# Patient Record
Sex: FEMALE | Race: WHITE | Employment: OTHER | URBAN - NONMETROPOLITAN AREA
[De-identification: names, ages, dates, MRNs, and addresses within clinical notes are randomized per-mention and may not be internally consistent; named-entity substitution may affect disease eponyms.]

---

## 2017-08-01 ENCOUNTER — OFFICE VISIT (OUTPATIENT)
Dept: SURGERY | Age: 82
End: 2017-08-01

## 2017-08-01 VITALS
SYSTOLIC BLOOD PRESSURE: 138 MMHG | BODY MASS INDEX: 24.8 KG/M2 | DIASTOLIC BLOOD PRESSURE: 72 MMHG | WEIGHT: 140 LBS | HEIGHT: 63 IN | RESPIRATION RATE: 15 BRPM | HEART RATE: 82 BPM

## 2017-08-01 DIAGNOSIS — L98.9 SKIN LESION: Primary | ICD-10-CM

## 2017-08-01 PROCEDURE — 4040F PNEUMOC VAC/ADMIN/RCVD: CPT | Performed by: SURGERY

## 2017-08-01 PROCEDURE — 1036F TOBACCO NON-USER: CPT | Performed by: SURGERY

## 2017-08-01 PROCEDURE — G8427 DOCREV CUR MEDS BY ELIG CLIN: HCPCS | Performed by: SURGERY

## 2017-08-01 PROCEDURE — 1123F ACP DISCUSS/DSCN MKR DOCD: CPT | Performed by: SURGERY

## 2017-08-01 PROCEDURE — G8420 CALC BMI NORM PARAMETERS: HCPCS | Performed by: SURGERY

## 2017-08-01 PROCEDURE — 99202 OFFICE O/P NEW SF 15 MIN: CPT | Performed by: SURGERY

## 2017-08-01 PROCEDURE — 1090F PRES/ABSN URINE INCON ASSESS: CPT | Performed by: SURGERY

## 2017-08-03 ENCOUNTER — HOSPITAL ENCOUNTER (OUTPATIENT)
Dept: SURGERY | Age: 82
Discharge: HOME | End: 2017-08-03
Attending: SURGERY | Admitting: SURGERY

## 2017-08-03 PROCEDURE — 11623 EXC S/N/H/F/G MAL+MRG 2.1-3: CPT | Performed by: SURGERY

## 2017-10-01 ENCOUNTER — HOSPITAL ENCOUNTER (OUTPATIENT)
Dept: OTHER | Age: 82
Discharge: HOME | End: 2017-10-31
Attending: FAMILY MEDICINE | Admitting: FAMILY MEDICINE

## 2017-10-20 LAB
ANION GAP SERPL CALCULATED.3IONS-SCNC: 11 MMOL/L (ref 4–16)
BASOPHILS ABSOLUTE: 0 K/CU MM
BASOPHILS RELATIVE PERCENT: 0.4 % (ref 0–1)
BUN BLDV-MCNC: 34 MG/DL (ref 6–23)
CALCIUM SERPL-MCNC: 8.3 MG/DL (ref 8.3–10.6)
CHLORIDE BLD-SCNC: 98 MMOL/L (ref 99–110)
CO2: 36 MMOL/L (ref 21–32)
CREAT SERPL-MCNC: 1.2 MG/DL (ref 0.6–1.1)
DIFFERENTIAL TYPE: ABNORMAL
EOSINOPHILS ABSOLUTE: 0.2 K/CU MM
EOSINOPHILS RELATIVE PERCENT: 2 % (ref 0–3)
GFR AFRICAN AMERICAN: 51 ML/MIN/1.73M2
GFR NON-AFRICAN AMERICAN: 42 ML/MIN/1.73M2
GLUCOSE BLD-MCNC: 95 MG/DL (ref 70–140)
HCT VFR BLD CALC: 32.9 % (ref 37–47)
HEMOGLOBIN: 10.3 GM/DL (ref 12.5–16)
IMMATURE NEUTROPHIL %: 0.3 % (ref 0–0.43)
LYMPHOCYTES ABSOLUTE: 1.3 K/CU MM
LYMPHOCYTES RELATIVE PERCENT: 14.5 % (ref 24–44)
MCH RBC QN AUTO: 31.1 PG (ref 27–31)
MCHC RBC AUTO-ENTMCNC: 31.3 % (ref 32–36)
MCV RBC AUTO: 99.4 FL (ref 78–100)
MONOCYTES ABSOLUTE: 0.6 K/CU MM
MONOCYTES RELATIVE PERCENT: 6.1 % (ref 0–4)
NUCLEATED RBC %: 0 %
PDW BLD-RTO: 13.6 % (ref 11.7–14.9)
PLATELET # BLD: 125 K/CU MM (ref 140–440)
PMV BLD AUTO: 10.3 FL (ref 7.5–11.1)
POTASSIUM SERPL-SCNC: 3.9 MMOL/L (ref 3.5–5.1)
RBC # BLD: 3.31 M/CU MM (ref 4.2–5.4)
SEGMENTED NEUTROPHILS ABSOLUTE COUNT: 7.1 K/CU MM
SEGMENTED NEUTROPHILS RELATIVE PERCENT: 76.7 % (ref 36–66)
SODIUM BLD-SCNC: 145 MMOL/L (ref 135–145)
TOTAL IMMATURE NEUTOROPHIL: 0.03 K/CU MM
TOTAL NUCLEATED RBC: 0 K/CU MM
TSH HIGH SENSITIVITY: 0.97 UIU/ML (ref 0.27–4.2)
WBC # BLD: 9.2 K/CU MM (ref 4–10.5)

## 2017-10-24 LAB
ANION GAP SERPL CALCULATED.3IONS-SCNC: 9 MMOL/L (ref 4–16)
BASOPHILS ABSOLUTE: 0 K/CU MM
BASOPHILS RELATIVE PERCENT: 0.5 % (ref 0–1)
BUN BLDV-MCNC: 26 MG/DL (ref 6–23)
CALCIUM SERPL-MCNC: 8.5 MG/DL (ref 8.3–10.6)
CHLORIDE BLD-SCNC: 102 MMOL/L (ref 99–110)
CO2: 37 MMOL/L (ref 21–32)
CREAT SERPL-MCNC: 1.1 MG/DL (ref 0.6–1.1)
DIFFERENTIAL TYPE: ABNORMAL
EOSINOPHILS ABSOLUTE: 0.4 K/CU MM
EOSINOPHILS RELATIVE PERCENT: 4.4 % (ref 0–3)
GFR AFRICAN AMERICAN: 56 ML/MIN/1.73M2
GFR NON-AFRICAN AMERICAN: 46 ML/MIN/1.73M2
GLUCOSE BLD-MCNC: 76 MG/DL (ref 70–140)
HCT VFR BLD CALC: 33.9 % (ref 37–47)
HEMOGLOBIN: 10.4 GM/DL (ref 12.5–16)
IMMATURE NEUTROPHIL %: 0.8 % (ref 0–0.43)
LYMPHOCYTES ABSOLUTE: 2.1 K/CU MM
LYMPHOCYTES RELATIVE PERCENT: 26.8 % (ref 24–44)
MCH RBC QN AUTO: 30.4 PG (ref 27–31)
MCHC RBC AUTO-ENTMCNC: 30.7 % (ref 32–36)
MCV RBC AUTO: 99.1 FL (ref 78–100)
MONOCYTES ABSOLUTE: 0.6 K/CU MM
MONOCYTES RELATIVE PERCENT: 7.4 % (ref 0–4)
NUCLEATED RBC %: 0 %
PDW BLD-RTO: 13.2 % (ref 11.7–14.9)
PLATELET # BLD: 126 K/CU MM (ref 140–440)
PMV BLD AUTO: 10.7 FL (ref 7.5–11.1)
POTASSIUM SERPL-SCNC: 4 MMOL/L (ref 3.5–5.1)
RBC # BLD: 3.42 M/CU MM (ref 4.2–5.4)
SEGMENTED NEUTROPHILS ABSOLUTE COUNT: 4.7 K/CU MM
SEGMENTED NEUTROPHILS RELATIVE PERCENT: 60.1 % (ref 36–66)
SODIUM BLD-SCNC: 148 MMOL/L (ref 135–145)
TOTAL IMMATURE NEUTOROPHIL: 0.06 K/CU MM
TOTAL NUCLEATED RBC: 0 K/CU MM
TSH HIGH SENSITIVITY: 1.86 UIU/ML (ref 0.27–4.2)
WBC # BLD: 7.9 K/CU MM (ref 4–10.5)

## 2017-11-01 ENCOUNTER — HOSPITAL ENCOUNTER (OUTPATIENT)
Dept: OTHER | Age: 82
Discharge: HOME | End: 2017-11-30
Attending: FAMILY MEDICINE | Admitting: FAMILY MEDICINE

## 2018-05-01 ENCOUNTER — HOSPITAL ENCOUNTER (OUTPATIENT)
Dept: OTHER | Age: 83
Discharge: HOME | End: 2018-05-31
Attending: FAMILY MEDICINE | Admitting: FAMILY MEDICINE

## 2018-05-10 ENCOUNTER — OUTSIDE SERVICES (OUTPATIENT)
Dept: WOUND CARE | Age: 83
End: 2018-05-10

## 2018-05-10 DIAGNOSIS — S81.811A NONINFECTED SKIN TEAR OF RIGHT LOWER EXTREMITY, INITIAL ENCOUNTER: Primary | ICD-10-CM

## 2018-05-10 PROCEDURE — 99304 1ST NF CARE SF/LOW MDM 25: CPT | Performed by: NURSE PRACTITIONER

## 2018-05-17 ENCOUNTER — OUTSIDE SERVICES (OUTPATIENT)
Dept: WOUND CARE | Age: 83
End: 2018-05-17

## 2018-05-17 DIAGNOSIS — S81.811D NONINFECTED SKIN TEAR OF RIGHT LOWER EXTREMITY, SUBSEQUENT ENCOUNTER: Primary | ICD-10-CM

## 2018-05-17 PROCEDURE — 99308 SBSQ NF CARE LOW MDM 20: CPT | Performed by: NURSE PRACTITIONER

## 2018-05-24 ENCOUNTER — OUTSIDE SERVICES (OUTPATIENT)
Dept: WOUND CARE | Age: 83
End: 2018-05-24

## 2018-05-24 DIAGNOSIS — S81.811D NONINFECTED SKIN TEAR OF RIGHT LOWER EXTREMITY, SUBSEQUENT ENCOUNTER: Primary | ICD-10-CM

## 2018-05-24 PROCEDURE — 99307 SBSQ NF CARE SF MDM 10: CPT | Performed by: NURSE PRACTITIONER

## 2018-05-31 LAB
ALBUMIN SERPL-MCNC: 3.2 GM/DL (ref 3.4–5)
ALP BLD-CCNC: 71 IU/L (ref 40–129)
ALT SERPL-CCNC: 30 U/L (ref 10–40)
ANION GAP SERPL CALCULATED.3IONS-SCNC: 12 MMOL/L (ref 4–16)
AST SERPL-CCNC: 13 IU/L (ref 15–37)
BASOPHILS ABSOLUTE: 0.1 K/CU MM
BASOPHILS RELATIVE PERCENT: 0.6 % (ref 0–1)
BILIRUB SERPL-MCNC: 0.4 MG/DL (ref 0–1)
BUN BLDV-MCNC: 16 MG/DL (ref 6–23)
CALCIUM SERPL-MCNC: 8.2 MG/DL (ref 8.3–10.6)
CHLORIDE BLD-SCNC: 100 MMOL/L (ref 99–110)
CO2: 34 MMOL/L (ref 21–32)
CREAT SERPL-MCNC: 1.1 MG/DL (ref 0.6–1.1)
DIFFERENTIAL TYPE: ABNORMAL
EOSINOPHILS ABSOLUTE: 0.5 K/CU MM
EOSINOPHILS RELATIVE PERCENT: 5.2 % (ref 0–3)
ESTIMATED AVERAGE GLUCOSE: 120 MG/DL
GFR AFRICAN AMERICAN: 56 ML/MIN/1.73M2
GFR NON-AFRICAN AMERICAN: 46 ML/MIN/1.73M2
GLUCOSE BLD-MCNC: 72 MG/DL (ref 70–99)
HBA1C MFR BLD: 5.8 % (ref 4.2–6.3)
HCT VFR BLD CALC: 34.1 % (ref 37–47)
HEMOGLOBIN: 10 GM/DL (ref 12.5–16)
IMMATURE NEUTROPHIL %: 0.7 % (ref 0–0.43)
LYMPHOCYTES ABSOLUTE: 2.3 K/CU MM
LYMPHOCYTES RELATIVE PERCENT: 26 % (ref 24–44)
MCH RBC QN AUTO: 29.9 PG (ref 27–31)
MCHC RBC AUTO-ENTMCNC: 29.3 % (ref 32–36)
MCV RBC AUTO: 101.8 FL (ref 78–100)
MONOCYTES ABSOLUTE: 0.7 K/CU MM
MONOCYTES RELATIVE PERCENT: 7.6 % (ref 0–4)
NUCLEATED RBC %: 0 %
PDW BLD-RTO: 13.2 % (ref 11.7–14.9)
PLATELET # BLD: 131 K/CU MM (ref 140–440)
PMV BLD AUTO: 10.3 FL (ref 7.5–11.1)
POTASSIUM SERPL-SCNC: 3.7 MMOL/L (ref 3.5–5.1)
RBC # BLD: 3.35 M/CU MM (ref 4.2–5.4)
SEGMENTED NEUTROPHILS ABSOLUTE COUNT: 5.2 K/CU MM
SEGMENTED NEUTROPHILS RELATIVE PERCENT: 59.9 % (ref 36–66)
SODIUM BLD-SCNC: 146 MMOL/L (ref 135–145)
TOTAL IMMATURE NEUTOROPHIL: 0.06 K/CU MM
TOTAL NUCLEATED RBC: 0 K/CU MM
TOTAL PROTEIN: 5.6 GM/DL (ref 6.4–8.2)
WBC # BLD: 8.7 K/CU MM (ref 4–10.5)

## 2018-06-01 ENCOUNTER — HOSPITAL ENCOUNTER (OUTPATIENT)
Dept: OTHER | Age: 83
Discharge: HOME | End: 2018-06-30
Attending: FAMILY MEDICINE | Admitting: FAMILY MEDICINE

## 2018-06-02 LAB
BACTERIA: NEGATIVE /HPF
BILIRUBIN URINE: NEGATIVE MG/DL
BLOOD, URINE: NEGATIVE
CALCIUM OXALATE CRYSTALS: ABNORMAL /HPF
CLARITY: ABNORMAL
COLOR: YELLOW
GLUCOSE, URINE: NEGATIVE MG/DL
HYALINE CASTS: 2 /LPF
KETONES, URINE: NEGATIVE MG/DL
LEUKOCYTE ESTERASE, URINE: NEGATIVE
MUCUS: ABNORMAL HPF
NITRITE URINE, QUANTITATIVE: NEGATIVE
PH, URINE: 5 (ref 5–8)
PROTEIN UA: NEGATIVE MG/DL
RBC URINE: 1 /HPF (ref 0–6)
SPECIFIC GRAVITY UA: 1.01 (ref 1–1.03)
SQUAMOUS EPITHELIAL: 4 /HPF
TRICHOMONAS: ABNORMAL /HPF
UROBILINOGEN, URINE: NORMAL MG/DL (ref 0.2–1)
WBC UA: <1 /HPF (ref 0–5)

## 2018-06-04 LAB
CULTURE: NORMAL
ORGANISM: NORMAL
REPORT STATUS: NORMAL
REQUEST PROBLEM: NORMAL
SPECIMEN: NORMAL
TOTAL COLONY COUNT: NORMAL

## 2018-07-01 ENCOUNTER — HOSPITAL ENCOUNTER (OUTPATIENT)
Dept: OTHER | Age: 83
Discharge: HOME | End: 2018-07-31
Attending: FAMILY MEDICINE | Admitting: FAMILY MEDICINE

## 2018-08-02 ENCOUNTER — OUTSIDE SERVICES (OUTPATIENT)
Dept: WOUND CARE | Age: 83
End: 2018-08-02

## 2018-08-02 DIAGNOSIS — E11.621 DIABETIC ULCER OF RIGHT HEEL ASSOCIATED WITH TYPE 2 DIABETES MELLITUS, UNSPECIFIED ULCER STAGE (HCC): Primary | ICD-10-CM

## 2018-08-02 DIAGNOSIS — L97.419 DIABETIC ULCER OF RIGHT HEEL ASSOCIATED WITH TYPE 2 DIABETES MELLITUS, UNSPECIFIED ULCER STAGE (HCC): Primary | ICD-10-CM

## 2018-08-02 PROCEDURE — 99308 SBSQ NF CARE LOW MDM 20: CPT | Performed by: NURSE PRACTITIONER

## 2018-08-09 ENCOUNTER — OUTSIDE SERVICES (OUTPATIENT)
Dept: WOUND CARE | Age: 83
End: 2018-08-09

## 2018-08-09 DIAGNOSIS — L97.419 DIABETIC ULCER OF RIGHT HEEL ASSOCIATED WITH TYPE 2 DIABETES MELLITUS, UNSPECIFIED ULCER STAGE (HCC): Primary | ICD-10-CM

## 2018-08-09 DIAGNOSIS — E11.621 DIABETIC ULCER OF RIGHT HEEL ASSOCIATED WITH TYPE 2 DIABETES MELLITUS, UNSPECIFIED ULCER STAGE (HCC): Primary | ICD-10-CM

## 2018-08-09 PROCEDURE — 99308 SBSQ NF CARE LOW MDM 20: CPT | Performed by: NURSE PRACTITIONER

## 2018-08-09 NOTE — PROGRESS NOTES
Wound Care Progress Note       Jaydon RAMOS Ridevan  AGE: 80 y.o. GENDER: female  : 1925  TODAY'S DATE:  2018        Subjective:     Chief Complaint   Patient presents with    Wound Check     right heel         HISTORY of PRESENT ILLNESS     Isaura Cheney is a 80 y.o. female who presents today for wound evaluation of Chronic diabetic wound(s) of right medial heel. The wound is of moderate severity. The underlying cause of the wound is diabetes and edema. Area was moist and dark purple last week, this week is more stable eschar. Wound Pain Timing/Severity: intermittent  Quality of pain: tender  Severity of pain:  2 / 10   Modifying Factors: edema, diabetes and decreased mobility  Associated Signs/Symptoms: none        PAST MEDICAL HISTORY        Diagnosis Date    A-fib [facility])     Carotid artery stenosis     [facility]     Chronic back pain     Compression fx, lumbar spine ([address]     scheduled for vertebroplasty 2014\"not sure if lumbar or thoracic\"    H/O 24 hour EKG monitoring 00    Frequent PACs, non sustained SVT with a rate of 130 bpm    H/O cardiovascular stress test 9/15/09    Normal pattern of perfusion in all coronaries EF 68%    H/O Doppler ultrasound      Less than 50% stenosis of both internal carotid arteries. 3/11 Carotid-No significant stenosis    H/O echocardiogram        Mild elevated pulm. systolic pressure. Mild MT/TR and aortic valve regur.   Mild MR, AR, mild pulmonary hypertension EF 50-55%    H/O hypotension     Hyperlipidemia     Hypertension     Lumbar spinal stenosis     Osteoarthritis     bilateral shoulder pain    [facility]) ([facility])     Type II or unspecified type diabetes mellitus without mention of complication, not stated as uncontrolled     UTI (lower urinary tract infection)        PAST SURGICAL HISTORY    Past Surgical History:   Procedure Laterality Date    CATARACT REMOVAL      mouth daily. No current facility-administered medications on file prior to visit. REVIEW OF SYSTEMS    Pertinent items are noted in HPI. Constitutional: Negative for systemic symptoms including fever, chills and malaise. Objective: There were no vitals taken for this visit. PHYSICAL EXAM    General: The patient is in no acute distress. Mental status:  Patient is appropriate, is  oriented to place and plan of care. Dermatologic exam: Visual inspection of the periwound reveals the skin to be edematous. Wound exam:  see wound description below     All active wounds listed below with today's date are evaluated    Right medial heel: Length: 3 cm, width 3 cm, depth JULIETTE, 100% stable eschar    Assessment:     Problem List Items Addressed This Visit     Diabetic ulcer of right heel associated with type 2 diabetes mellitus ([facility] [address] - Primary          Status of wound progress and description from last visit:   unchanged. Plan:      Do not scrub or use excessive force. Wash hands with soap and water before and after dressing changes. Prior to dressing application, cleanse wound with normal saline, wound cleanser, or mild soap and water. To right medial heel, paint with betadine, cover with telfa, and wrap with conform. Change daily and as needed. Avoid pressure to the heel area, including shoes that have backs. Float heels off of mattress and chairs. Encourage patient to elevate legs throughout the day. Patient may wear tubi  stockings - place on in the morning and take off at night.        Electronically signed by RENALDO Corona CNP on 8/9/2018 at 9:45 AM

## 2018-08-16 ENCOUNTER — OUTSIDE SERVICES (OUTPATIENT)
Dept: WOUND CARE | Age: 83
End: 2018-08-16

## 2018-08-16 DIAGNOSIS — L97.419 DIABETIC ULCER OF RIGHT HEEL ASSOCIATED WITH TYPE 2 DIABETES MELLITUS, UNSPECIFIED ULCER STAGE (HCC): Primary | ICD-10-CM

## 2018-08-16 DIAGNOSIS — E11.621 DIABETIC ULCER OF RIGHT HEEL ASSOCIATED WITH TYPE 2 DIABETES MELLITUS, UNSPECIFIED ULCER STAGE (HCC): Primary | ICD-10-CM

## 2018-08-16 PROCEDURE — 11042 DBRDMT SUBQ TIS 1ST 20SQCM/<: CPT | Performed by: NURSE PRACTITIONER

## 2018-08-16 NOTE — PROGRESS NOTES
Wound Care Progress Note with Procedure Note    Jaydon Gaines  AGE: 80 y.o. GENDER: female  : 1925  EPISODE DATE:  2018     Subjective:     Chief Complaint   Patient presents with    Wound Check     right heel         HISTORY of PRESENT ILLNESS      Jo Dobson is a 80 y.o. female who presents today for wound evaluation of Chronic diabetic wound(s) of the right medial heel. The wound is of moderate severity. The underlying cause of the wound is diabetes. Today, an area of necrotic tissue was debrided from the wound with healthy pink granulation tissue underneath. Wound Pain Timing/Severity: intermittent  Quality of pain: tender  Severity of pain:  3 / 10   Modifying Factors: edema, diabetes and decreased mobility  Associated Signs/Symptoms: none        PAST MEDICAL HISTORY        Diagnosis Date    A-fib [facility])     Carotid artery stenosis     [facility]congest[facility]) [facility])     Chronic back pain     Compression fx, lumbar spine [address] [address]     scheduled for vertebroplasty 2014\"not sure if lumbar or thoracic\"    H/O 24 hour EKG monitoring 00    Frequent PACs, non sustained SVT with a rate of 130 bpm    H/O cardiovascular stress test 9/15/09    Normal pattern of perfusion in all coronaries EF 68%    H/O Doppler ultrasound      Less than 50% stenosis of both internal carotid arteries. 3/11 Carotid-No significant stenosis    H/O echocardiogram        Mild elevated pulm. systolic pressure. Mild MT/TR and aortic valve regur.   Mild MR, AR, mild pulmonary hypertension EF 50-55%    H/O hypotension     Hyperlipidemia     Hypertension     Lumbar spinal stenosis     Osteoarthritis     bilateral shoulder pain    PVD (peripheral vascular disease) ([facility])     Type II or unspecified type diabetes mellitus without mention of complication, not stated as uncontrolled     UTI (lower urinary tract infection)        PAST SURGICAL HISTORY    Past Surgical History:   Procedure atorvastatin (LIPITOR) 20 MG tablet Take 20 mg by mouth daily. No current facility-administered medications on file prior to visit. REVIEW OF SYSTEMS    Pertinent items are noted in HPI. Constitutional: Negative for systemic symptoms including fever, chills and malaise. Objective: There were no vitals taken for this visit. PHYSICAL EXAM    General: The patient is in no acute distress. Mental status:  Patient is appropriate, is  oriented to place and plan of care. Dermatologic exam: Visual inspection of the periwound reveals the skin to be edematous  Wound exam: see wound description below in procedure note    Right medial heel: Length: 3 cm, width 2.8 cm, depth JULIETTE, 90% stable eschar, 10% red      Assessment:     Problem List Items Addressed This Visit     Diabetic ulcer of right heel associated with type 2 diabetes mellitus ([facility] [address] - Primary        Procedure Note    Indications:  Based on my examination of this patient's wound(s) today, sharp excision into necrotic subcutaneous tissue is required to promote healing and evaluate the extent of previous healing. Performed by: RENALDO Laureano - CNP    Consent obtained: Yes    Time out taken:  Yes    Pain Control: Not needed       Debridement:Excisional Debridement    Using #15 blade scalpel and forceps the wound(s) was/were sharply debrided down through and including the removal of subcutaneous tissue.         Devitalized Tissue Debrided:  necrotic/eschar    Pre Debridement Measurements:  Are located in the Wound Documentation Flow Sheet    All active wounds listed below with today's date are evaluated  Wound(s)    debrided this date include # : right medial heel    Post  Debridement Measurements:  Right medial heel: Length: 3 cm, width 2.8 cm, depth JULIETTE    Percent of Wound(s) Debrided: approximately 20%    Total  Area  Debrided:  2 sq cm     Bleeding:  None    Hemostasis Achieved:  not needed    Procedural Pain:  3  / 10

## 2018-08-23 ENCOUNTER — OUTSIDE SERVICES (OUTPATIENT)
Dept: WOUND CARE | Age: 83
End: 2018-08-23

## 2018-08-23 DIAGNOSIS — E11.621 DIABETIC ULCER OF RIGHT HEEL ASSOCIATED WITH TYPE 2 DIABETES MELLITUS, UNSPECIFIED ULCER STAGE (HCC): Primary | ICD-10-CM

## 2018-08-23 DIAGNOSIS — L97.419 DIABETIC ULCER OF RIGHT HEEL ASSOCIATED WITH TYPE 2 DIABETES MELLITUS, UNSPECIFIED ULCER STAGE (HCC): Primary | ICD-10-CM

## 2018-08-23 PROCEDURE — 99308 SBSQ NF CARE LOW MDM 20: CPT | Performed by: NURSE PRACTITIONER

## 2018-08-23 NOTE — PROGRESS NOTES
Wound Care Progress Note       Jaydon RAMOS Ridevan  AGE: 80 y.o. GENDER: female  : 1925  TODAY'S DATE:  2018        Subjective:     Chief Complaint   Patient presents with    Wound Check     right heel         HISTORY of PRESENT ILLNESS     Cesar Duvall is a 80 y.o. female who presents today for wound evaluation of Chronic diabetic wound(s) of right medial heel. The wound is of moderate severity. The underlying cause of the wound is diabetes. Wound Pain Timing/Severity: intermittent  Quality of pain: aching  Severity of pain:  2 / 10   Modifying Factors: edema, diabetes and decreased mobility  Associated Signs/Symptoms: none        PAST MEDICAL HISTORY        Diagnosis Date    A-fib [facility])     Carotid artery stenosis     [facility]congest[facility]) ([facility])     Chronic back pain     Compression fx, lumbar spine [address] [address]     scheduled for vertebroplasty 2014\"not sure if lumbar or thoracic\"    H/O 24 hour EKG monitoring 00    Frequent PACs, non sustained SVT with a rate of 130 bpm    H/O cardiovascular stress test 9/15/09    Normal pattern of perfusion in all coronaries EF 68%    H/O Doppler ultrasound      Less than 50% stenosis of both internal carotid arteries. 3/11 Carotid-No significant stenosis    H/O echocardiogram        Mild elevated pulm. systolic pressure. Mild MT/TR and aortic valve regur.   Mild MR, AR, mild pulmonary hypertension EF 50-55%    H/O hypotension     Hyperlipidemia     Hypertension     Lumbar spinal stenosis     Osteoarthritis     bilateral shoulder pain    PVD [facility]peripheral vascular disease) ([facility])     Type II or unspecified type diabetes mellitus without mention of complication, not stated as uncontrolled     UTI (lower urinary tract infection)        PAST SURGICAL HISTORY    Past Surgical History:   Procedure Laterality Date    CATARACT REMOVAL      CHOLECYSTECTOMY      CYSTOCELE REPAIR      GALLBLADDER SURGERY      HYSTERECTOMY      JOINT REPLACEMENT      RECTOCELE REPAIR      GARY AND BSO      TOTAL HIP ARTHROPLASTY      bilateral       FAMILY HISTORY    Family History   Problem Relation Age of Onset    Diabetes Mother     Stroke Mother     Heart Disease Sister     Diabetes Sister     Heart Disease Brother     Diabetes Brother     Heart Disease Sister     Diabetes Sister     Heart Disease Brother     Diabetes Brother        SOCIAL HISTORY    Social History   Substance Use Topics    Smoking status: Never Smoker    Smokeless tobacco: Never Used      Comment: caregiver unsure of pts social of surgical hx with phone assess 2/25/2014    Alcohol use No       ALLERGIES    No Known Allergies    MEDICATIONS    Current Outpatient Prescriptions on File Prior to Visit   Medication Sig Dispense Refill    simvastatin (ZOCOR) 20 MG tablet Take 20 mg by mouth nightly.  HYDROcodone-acetaminophen (NORCO) 5-325 MG per tablet Take 1 tablet by mouth every 6 hours as needed for Pain.  traMADol (ULTRAM) 50 MG tablet Take 50 mg by mouth every 8 hours as needed for Pain.  aspirin 81 MG EC tablet Take 1 tablet by mouth daily. 30 tablet 1    insulin glargine (LANTUS) 100 UNIT/ML injection Inject 12 Units into the skin nightly. 1 Pen 3    docusate sodium (COLACE) 100 MG capsule Take 1 capsule by mouth 2 times daily as needed for Constipation. 30 capsule 0    furosemide (LASIX) 40 MG tablet Take 0.5 tablets by mouth as needed (LL swelling). 30 tablet 1    acetaminophen 650 MG TABS Take 650 mg by mouth every 4 hours as needed for Pain. 30 tablet 1    carvedilol (COREG) 6.25 MG tablet Take 6.25 mg by mouth 2 times daily (with meals).  citalopram (CELEXA) 10 MG tablet Take 10 mg by mouth daily.  timolol (BETIMOL) 0.25 % ophthalmic solution Place 1-2 drops into both eyes 2 times daily.  atorvastatin (LIPITOR) 20 MG tablet Take 20 mg by mouth daily. No current facility-administered medications on file prior to visit.

## 2018-08-30 ENCOUNTER — OUTSIDE SERVICES (OUTPATIENT)
Dept: WOUND CARE | Age: 83
End: 2018-08-30

## 2018-08-30 DIAGNOSIS — L97.419 DIABETIC ULCER OF RIGHT HEEL ASSOCIATED WITH TYPE 2 DIABETES MELLITUS, UNSPECIFIED ULCER STAGE (HCC): Primary | ICD-10-CM

## 2018-08-30 DIAGNOSIS — E11.621 DIABETIC ULCER OF RIGHT HEEL ASSOCIATED WITH TYPE 2 DIABETES MELLITUS, UNSPECIFIED ULCER STAGE (HCC): Primary | ICD-10-CM

## 2018-08-30 PROCEDURE — 99308 SBSQ NF CARE LOW MDM 20: CPT | Performed by: NURSE PRACTITIONER

## 2018-08-30 NOTE — PROGRESS NOTES
stated as uncontrolled     UTI (lower urinary tract infection)        PAST SURGICAL HISTORY    Past Surgical History:   Procedure Laterality Date    CATARACT REMOVAL      CHOLECYSTECTOMY      CYSTOCELE REPAIR      GALLBLADDER SURGERY      HYSTERECTOMY      JOINT REPLACEMENT      RECTOCELE REPAIR      GARY AND BSO      TOTAL HIP ARTHROPLASTY      bilateral       FAMILY HISTORY    Family History   Problem Relation Age of Onset    Diabetes Mother     Stroke Mother     Heart Disease Sister     Diabetes Sister     Heart Disease Brother     Diabetes Brother     Heart Disease Sister     Diabetes Sister     Heart Disease Brother     Diabetes Brother        SOCIAL HISTORY    Social History   Substance Use Topics    Smoking status: Never Smoker    Smokeless tobacco: Never Used      Comment: caregiver unsure of pts social of surgical hx with phone assess 2/25/2014    Alcohol use No       ALLERGIES    No Known Allergies    MEDICATIONS    Current Outpatient Prescriptions on File Prior to Visit   Medication Sig Dispense Refill    simvastatin (ZOCOR) 20 MG tablet Take 20 mg by mouth nightly.  HYDROcodone-acetaminophen (NORCO) 5-325 MG per tablet Take 1 tablet by mouth every 6 hours as needed for Pain.  traMADol (ULTRAM) 50 MG tablet Take 50 mg by mouth every 8 hours as needed for Pain.  aspirin 81 MG EC tablet Take 1 tablet by mouth daily. 30 tablet 1    insulin glargine (LANTUS) 100 UNIT/ML injection Inject 12 Units into the skin nightly. 1 Pen 3    docusate sodium (COLACE) 100 MG capsule Take 1 capsule by mouth 2 times daily as needed for Constipation. 30 capsule 0    furosemide (LASIX) 40 MG tablet Take 0.5 tablets by mouth as needed (LL swelling). 30 tablet 1    acetaminophen 650 MG TABS Take 650 mg by mouth every 4 hours as needed for Pain. 30 tablet 1    carvedilol (COREG) 6.25 MG tablet Take 6.25 mg by mouth 2 times daily (with meals).       citalopram (CELEXA) 10 MG tablet Take 10

## 2018-09-06 ENCOUNTER — OUTSIDE SERVICES (OUTPATIENT)
Dept: WOUND CARE | Age: 83
End: 2018-09-06

## 2018-09-06 DIAGNOSIS — E11.621 DIABETIC ULCER OF RIGHT HEEL ASSOCIATED WITH TYPE 2 DIABETES MELLITUS, UNSPECIFIED ULCER STAGE (HCC): Primary | ICD-10-CM

## 2018-09-06 DIAGNOSIS — L97.419 DIABETIC ULCER OF RIGHT HEEL ASSOCIATED WITH TYPE 2 DIABETES MELLITUS, UNSPECIFIED ULCER STAGE (HCC): Primary | ICD-10-CM

## 2018-09-06 PROCEDURE — 99308 SBSQ NF CARE LOW MDM 20: CPT | Performed by: NURSE PRACTITIONER

## 2018-09-20 ENCOUNTER — OUTSIDE SERVICES (OUTPATIENT)
Dept: WOUND CARE | Age: 83
End: 2018-09-20

## 2018-09-20 DIAGNOSIS — E11.621 DIABETIC ULCER OF RIGHT HEEL ASSOCIATED WITH TYPE 2 DIABETES MELLITUS, UNSPECIFIED ULCER STAGE (HCC): Primary | ICD-10-CM

## 2018-09-20 DIAGNOSIS — I73.9 PERIPHERAL VASCULAR DISEASE (HCC): ICD-10-CM

## 2018-09-20 DIAGNOSIS — L97.419 DIABETIC ULCER OF RIGHT HEEL ASSOCIATED WITH TYPE 2 DIABETES MELLITUS, UNSPECIFIED ULCER STAGE (HCC): Primary | ICD-10-CM

## 2018-09-20 PROCEDURE — 99308 SBSQ NF CARE LOW MDM 20: CPT | Performed by: NURSE PRACTITIONER

## 2018-09-20 NOTE — PROGRESS NOTES
MG tablet Take 20 mg by mouth daily. No current facility-administered medications on file prior to visit. REVIEW OF SYSTEMS    Pertinent items are noted in HPI. Constitutional: Negative for systemic symptoms including fever, chills and malaise. Objective: There were no vitals taken for this visit. PHYSICAL EXAM    General: The patient is in no acute distress. Mental status:  Patient is appropriate, is  oriented to place and plan of care. Dermatologic exam: Visual inspection of the periwound reveals the skin to be edematous. Wound exam:  see wound description below     All active wounds listed below with today's date are evaluated    Right medial heel: Length: 3.5 cm, width 2.5 cm, depth JULIETTE, 100% stable eschar    Assessment:       Problem List Items Addressed This Visit     Diabetic ulcer of right heel associated with type 2 diabetes mellitus ([facility] [address] - Primary      Other Visit Diagnoses     [facility]              Status of wound progress and description from last visit:  Slightly improved. Plan:      Do not scrub or use excessive force. Wash hands with soap and water before and after dressing changes. Prior to dressing application, cleanse wound with normal saline, wound cleanser, or mild soap and water. To right medial heel, paint with betadine, cover with telfa, and wrap with conform. Change daily and as needed. Avoid pressure to the heel area, including shoes that have backs. Float heels off of mattress and chairs. Encourage patient to elevate legs throughout the day. Patient may wear tubi  stockings - place on in the morning and take off at night.        Electronically signed by RENALDO Tate CNP on 9/20/2018 at 10:36 AM

## 2018-09-27 ENCOUNTER — OUTSIDE SERVICES (OUTPATIENT)
Dept: WOUND CARE | Age: 83
End: 2018-09-27
Payer: MEDICARE

## 2018-09-27 DIAGNOSIS — E11.621 DIABETIC ULCER OF RIGHT HEEL ASSOCIATED WITH TYPE 2 DIABETES MELLITUS, UNSPECIFIED ULCER STAGE (HCC): Primary | ICD-10-CM

## 2018-09-27 DIAGNOSIS — L97.419 DIABETIC ULCER OF RIGHT HEEL ASSOCIATED WITH TYPE 2 DIABETES MELLITUS, UNSPECIFIED ULCER STAGE (HCC): Primary | ICD-10-CM

## 2018-09-27 PROCEDURE — 99308 SBSQ NF CARE LOW MDM 20: CPT | Performed by: NURSE PRACTITIONER

## 2018-10-04 ENCOUNTER — OUTSIDE SERVICES (OUTPATIENT)
Dept: WOUND CARE | Age: 83
End: 2018-10-04
Payer: MEDICARE

## 2018-10-04 DIAGNOSIS — E11.621 DIABETIC ULCER OF RIGHT HEEL ASSOCIATED WITH TYPE 2 DIABETES MELLITUS, UNSPECIFIED ULCER STAGE (HCC): Primary | ICD-10-CM

## 2018-10-04 DIAGNOSIS — I73.9 PAD (PERIPHERAL ARTERY DISEASE) (HCC): ICD-10-CM

## 2018-10-04 DIAGNOSIS — L97.419 DIABETIC ULCER OF RIGHT HEEL ASSOCIATED WITH TYPE 2 DIABETES MELLITUS, UNSPECIFIED ULCER STAGE (HCC): Primary | ICD-10-CM

## 2018-10-04 PROCEDURE — 99308 SBSQ NF CARE LOW MDM 20: CPT | Performed by: NURSE PRACTITIONER

## 2018-10-04 NOTE — PROGRESS NOTES
MG tablet Take 20 mg by mouth daily. No current facility-administered medications on file prior to visit. REVIEW OF SYSTEMS    Pertinent items are noted in HPI. Constitutional: Negative for systemic symptoms including fever, chills and malaise. Objective: There were no vitals taken for this visit. PHYSICAL EXAM    General: The patient is in no acute distress. Mental status:  Patient is appropriate, is  oriented to place and plan of care. Dermatologic exam: Visual inspection of the periwound reveals the skin to be edematous. Wound exam:  see wound description below     All active wounds listed below with today's date are evaluated    Right medial heel: Length: 2.5 cm, width 3.2 cm, depth JULIETTE, 100% stable eschar      Assessment:     Problem List Items Addressed This Visit     Diabetic ulcer of right heel associated with type 2 diabetes mellitus ([facility] [address] - Primary      Other Visit Diagnoses     [facility]              Status of wound progress and description from last visit:   unchanged. Plan:      Do not scrub or use excessive force. Wash hands with soap and water before and after dressing changes. Prior to dressing application, cleanse wound with normal saline, wound cleanser, or mild soap and water. To right medial heel, paint with betadine, cover with telfa, and wrap with conform. Change daily and as needed. Avoid pressure to the heel area, including shoes that have backs. Float heels off of mattress and chairs. Encourage patient to elevate legs throughout the day. Patient may wear tubi  stockings - place on in the morning and take off at night.        Electronically signed by RENALDO Herman CNP on 10/4/2018 at 10:59 AM

## 2018-10-11 ENCOUNTER — OUTSIDE SERVICES (OUTPATIENT)
Dept: WOUND CARE | Age: 83
End: 2018-10-11
Payer: MEDICARE

## 2018-10-11 DIAGNOSIS — L97.419 DIABETIC ULCER OF RIGHT HEEL ASSOCIATED WITH TYPE 2 DIABETES MELLITUS, UNSPECIFIED ULCER STAGE (HCC): Primary | ICD-10-CM

## 2018-10-11 DIAGNOSIS — E11.621 DIABETIC ULCER OF RIGHT HEEL ASSOCIATED WITH TYPE 2 DIABETES MELLITUS, UNSPECIFIED ULCER STAGE (HCC): Primary | ICD-10-CM

## 2018-10-11 PROCEDURE — 99308 SBSQ NF CARE LOW MDM 20: CPT | Performed by: NURSE PRACTITIONER

## 2018-10-25 ENCOUNTER — OUTSIDE SERVICES (OUTPATIENT)
Dept: WOUND CARE | Age: 83
End: 2018-10-25
Payer: MEDICARE

## 2018-10-25 DIAGNOSIS — L97.419 DIABETIC ULCER OF RIGHT HEEL ASSOCIATED WITH TYPE 2 DIABETES MELLITUS, UNSPECIFIED ULCER STAGE (HCC): Primary | ICD-10-CM

## 2018-10-25 DIAGNOSIS — E11.621 DIABETIC ULCER OF RIGHT HEEL ASSOCIATED WITH TYPE 2 DIABETES MELLITUS, UNSPECIFIED ULCER STAGE (HCC): Primary | ICD-10-CM

## 2018-10-25 DIAGNOSIS — I73.9 PAD (PERIPHERAL ARTERY DISEASE) (HCC): ICD-10-CM

## 2018-10-25 PROCEDURE — 99308 SBSQ NF CARE LOW MDM 20: CPT | Performed by: NURSE PRACTITIONER

## 2018-11-01 ENCOUNTER — OUTSIDE SERVICES (OUTPATIENT)
Dept: WOUND CARE | Age: 83
End: 2018-11-01
Payer: MEDICARE

## 2018-11-01 DIAGNOSIS — I73.9 PAD (PERIPHERAL ARTERY DISEASE) (HCC): ICD-10-CM

## 2018-11-01 DIAGNOSIS — L97.419 DIABETIC ULCER OF RIGHT HEEL ASSOCIATED WITH TYPE 2 DIABETES MELLITUS, UNSPECIFIED ULCER STAGE (HCC): Primary | ICD-10-CM

## 2018-11-01 DIAGNOSIS — E11.621 DIABETIC ULCER OF RIGHT HEEL ASSOCIATED WITH TYPE 2 DIABETES MELLITUS, UNSPECIFIED ULCER STAGE (HCC): Primary | ICD-10-CM

## 2018-11-01 PROCEDURE — 99308 SBSQ NF CARE LOW MDM 20: CPT | Performed by: NURSE PRACTITIONER

## 2018-11-01 NOTE — PROGRESS NOTES
Take 20 mg by mouth daily. No current facility-administered medications on file prior to visit. REVIEW OF SYSTEMS    Pertinent items are noted in HPI. Constitutional: Negative for systemic symptoms including fever, chills and malaise. Objective: There were no vitals taken for this visit. PHYSICAL EXAM    General: The patient is in no acute distress. Mental status:  Patient is appropriate, is  oriented to place and plan of care. Dermatologic exam: Visual inspection of the periwound reveals the skin to be edematous. Wound exam:  see wound description below     All active wounds listed below with today's date are evaluated    Right medial heel: Length: 2.8 cm, width 3.4 cm, depth JULIETTE, 100% stable black eschar      Assessment:     Problem List Items Addressed This Visit     Diabetic ulcer of right heel associated with type 2 diabetes mellitus ([facility] [address] - Primary    PAD (peripheral artery disease) ([facility] [address]          Status of wound progress and description from last visit:   unchanged. Plan:      Do not scrub or use excessive force. Wash hands with soap and water before and after dressing changes. Prior to dressing application, cleanse wound with normal saline, wound cleanser, or mild soap and water. To right medial heel, paint with betadine, cover with abd pad, and wrap with conform. Change daily and as needed. Avoid pressure to the heel area, including shoes that have backs. Float heels off of mattress and chairs. Encourage patient to elevate legs throughout the day. Patient may wear tubi  stockings - place on in the morning and take off at night.          Electronically signed by RENALDO Rehman CNP on 11/1/2018 at 11:02 AM

## 2018-11-08 ENCOUNTER — OUTSIDE SERVICES (OUTPATIENT)
Dept: WOUND CARE | Age: 83
End: 2018-11-08
Payer: MEDICARE

## 2018-11-08 DIAGNOSIS — L97.419 DIABETIC ULCER OF RIGHT HEEL ASSOCIATED WITH TYPE 2 DIABETES MELLITUS, UNSPECIFIED ULCER STAGE (HCC): ICD-10-CM

## 2018-11-08 DIAGNOSIS — E11.621 DIABETIC ULCER OF RIGHT HEEL ASSOCIATED WITH TYPE 2 DIABETES MELLITUS, UNSPECIFIED ULCER STAGE (HCC): ICD-10-CM

## 2018-11-08 DIAGNOSIS — I73.9 PAD (PERIPHERAL ARTERY DISEASE) (HCC): Primary | ICD-10-CM

## 2018-11-08 PROCEDURE — 11042 DBRDMT SUBQ TIS 1ST 20SQCM/<: CPT | Performed by: NURSE PRACTITIONER

## 2018-11-15 ENCOUNTER — OUTSIDE SERVICES (OUTPATIENT)
Dept: WOUND CARE | Age: 83
End: 2018-11-15
Payer: MEDICARE

## 2018-11-15 DIAGNOSIS — L97.419 DIABETIC ULCER OF RIGHT HEEL ASSOCIATED WITH TYPE 2 DIABETES MELLITUS, UNSPECIFIED ULCER STAGE (HCC): Primary | ICD-10-CM

## 2018-11-15 DIAGNOSIS — I73.9 PAD (PERIPHERAL ARTERY DISEASE) (HCC): ICD-10-CM

## 2018-11-15 DIAGNOSIS — E11.621 DIABETIC ULCER OF RIGHT HEEL ASSOCIATED WITH TYPE 2 DIABETES MELLITUS, UNSPECIFIED ULCER STAGE (HCC): Primary | ICD-10-CM

## 2018-11-15 PROCEDURE — 99308 SBSQ NF CARE LOW MDM 20: CPT | Performed by: NURSE PRACTITIONER

## 2018-11-15 NOTE — PROGRESS NOTES
Procedure Laterality Date    CATARACT REMOVAL      CHOLECYSTECTOMY      CYSTOCELE REPAIR      GALLBLADDER SURGERY      HYSTERECTOMY      JOINT REPLACEMENT      RECTOCELE REPAIR      GARY AND BSO      TOTAL HIP ARTHROPLASTY      bilateral       FAMILY HISTORY    Family History   Problem Relation Age of Onset    Diabetes Mother     Stroke Mother     Heart Disease Sister     Diabetes Sister     Heart Disease Brother     Diabetes Brother     Heart Disease Sister     Diabetes Sister     Heart Disease Brother     Diabetes Brother        SOCIAL HISTORY    Social History   Substance Use Topics    Smoking status: Never Smoker    Smokeless tobacco: Never Used      Comment: caregiver unsure of pts social of surgical hx with phone assess 2/25/2014    Alcohol use No       ALLERGIES    No Known Allergies    MEDICATIONS    Current Outpatient Prescriptions on File Prior to Visit   Medication Sig Dispense Refill    simvastatin (ZOCOR) 20 MG tablet Take 20 mg by mouth nightly.  HYDROcodone-acetaminophen (NORCO) 5-325 MG per tablet Take 1 tablet by mouth every 6 hours as needed for Pain.  traMADol (ULTRAM) 50 MG tablet Take 50 mg by mouth every 8 hours as needed for Pain.  aspirin 81 MG EC tablet Take 1 tablet by mouth daily. 30 tablet 1    insulin glargine (LANTUS) 100 UNIT/ML injection Inject 12 Units into the skin nightly. 1 Pen 3    docusate sodium (COLACE) 100 MG capsule Take 1 capsule by mouth 2 times daily as needed for Constipation. 30 capsule 0    furosemide (LASIX) 40 MG tablet Take 0.5 tablets by mouth as needed (LL swelling). 30 tablet 1    acetaminophen 650 MG TABS Take 650 mg by mouth every 4 hours as needed for Pain. 30 tablet 1    carvedilol (COREG) 6.25 MG tablet Take 6.25 mg by mouth 2 times daily (with meals).  citalopram (CELEXA) 10 MG tablet Take 10 mg by mouth daily.  timolol (BETIMOL) 0.25 % ophthalmic solution Place 1-2 drops into both eyes 2 times daily.

## 2018-11-20 ENCOUNTER — OUTSIDE SERVICES (OUTPATIENT)
Dept: WOUND CARE | Age: 83
End: 2018-11-20
Payer: MEDICARE

## 2018-11-20 DIAGNOSIS — E11.621 DIABETIC ULCER OF RIGHT HEEL ASSOCIATED WITH TYPE 2 DIABETES MELLITUS, UNSPECIFIED ULCER STAGE (HCC): Primary | ICD-10-CM

## 2018-11-20 DIAGNOSIS — L97.419 DIABETIC ULCER OF RIGHT HEEL ASSOCIATED WITH TYPE 2 DIABETES MELLITUS, UNSPECIFIED ULCER STAGE (HCC): Primary | ICD-10-CM

## 2018-11-20 DIAGNOSIS — I73.9 PAD (PERIPHERAL ARTERY DISEASE) (HCC): ICD-10-CM

## 2018-11-20 PROCEDURE — 99307 SBSQ NF CARE SF MDM 10: CPT | Performed by: NURSE PRACTITIONER

## 2018-11-29 ENCOUNTER — OUTSIDE SERVICES (OUTPATIENT)
Dept: WOUND CARE | Age: 83
End: 2018-11-29
Payer: MEDICARE

## 2018-11-29 DIAGNOSIS — E11.621 DIABETIC ULCER OF RIGHT HEEL ASSOCIATED WITH TYPE 2 DIABETES MELLITUS, UNSPECIFIED ULCER STAGE (HCC): Primary | ICD-10-CM

## 2018-11-29 DIAGNOSIS — L97.419 DIABETIC ULCER OF RIGHT HEEL ASSOCIATED WITH TYPE 2 DIABETES MELLITUS, UNSPECIFIED ULCER STAGE (HCC): Primary | ICD-10-CM

## 2018-11-29 DIAGNOSIS — I73.9 PAD (PERIPHERAL ARTERY DISEASE) (HCC): ICD-10-CM

## 2018-11-29 PROCEDURE — 99308 SBSQ NF CARE LOW MDM 20: CPT | Performed by: NURSE PRACTITIONER

## 2018-12-06 ENCOUNTER — OUTSIDE SERVICES (OUTPATIENT)
Dept: WOUND CARE | Age: 83
End: 2018-12-06
Payer: MEDICARE

## 2018-12-06 DIAGNOSIS — L97.419 DIABETIC ULCER OF RIGHT HEEL ASSOCIATED WITH TYPE 2 DIABETES MELLITUS, UNSPECIFIED ULCER STAGE (HCC): Primary | ICD-10-CM

## 2018-12-06 DIAGNOSIS — I73.9 PAD (PERIPHERAL ARTERY DISEASE) (HCC): ICD-10-CM

## 2018-12-06 DIAGNOSIS — E11.621 DIABETIC ULCER OF RIGHT HEEL ASSOCIATED WITH TYPE 2 DIABETES MELLITUS, UNSPECIFIED ULCER STAGE (HCC): Primary | ICD-10-CM

## 2018-12-06 PROCEDURE — 99308 SBSQ NF CARE LOW MDM 20: CPT | Performed by: NURSE PRACTITIONER

## 2018-12-06 NOTE — PROGRESS NOTES
 CHOLECYSTECTOMY      CYSTOCELE REPAIR      GALLBLADDER SURGERY      HYSTERECTOMY      JOINT REPLACEMENT      RECTOCELE REPAIR      GARY AND BSO      TOTAL HIP ARTHROPLASTY      bilateral       FAMILY HISTORY    Family History   Problem Relation Age of Onset    Diabetes Mother     Stroke Mother     Heart Disease Sister     Diabetes Sister     Heart Disease Brother     Diabetes Brother     Heart Disease Sister     Diabetes Sister     Heart Disease Brother     Diabetes Brother        SOCIAL HISTORY    Social History   Substance Use Topics    Smoking status: Never Smoker    Smokeless tobacco: Never Used      Comment: caregiver unsure of pts social of surgical hx with phone assess 2/25/2014    Alcohol use No       ALLERGIES    No Known Allergies    MEDICATIONS    Current Outpatient Prescriptions on File Prior to Visit   Medication Sig Dispense Refill    simvastatin (ZOCOR) 20 MG tablet Take 20 mg by mouth nightly.  HYDROcodone-acetaminophen (NORCO) 5-325 MG per tablet Take 1 tablet by mouth every 6 hours as needed for Pain.  traMADol (ULTRAM) 50 MG tablet Take 50 mg by mouth every 8 hours as needed for Pain.  aspirin 81 MG EC tablet Take 1 tablet by mouth daily. 30 tablet 1    insulin glargine (LANTUS) 100 UNIT/ML injection Inject 12 Units into the skin nightly. 1 Pen 3    docusate sodium (COLACE) 100 MG capsule Take 1 capsule by mouth 2 times daily as needed for Constipation. 30 capsule 0    furosemide (LASIX) 40 MG tablet Take 0.5 tablets by mouth as needed (LL swelling). 30 tablet 1    acetaminophen 650 MG TABS Take 650 mg by mouth every 4 hours as needed for Pain. 30 tablet 1    carvedilol (COREG) 6.25 MG tablet Take 6.25 mg by mouth 2 times daily (with meals).  citalopram (CELEXA) 10 MG tablet Take 10 mg by mouth daily.  timolol (BETIMOL) 0.25 % ophthalmic solution Place 1-2 drops into both eyes 2 times daily.       atorvastatin (LIPITOR) 20 MG tablet Take 20

## 2018-12-13 ENCOUNTER — OUTSIDE SERVICES (OUTPATIENT)
Dept: WOUND CARE | Age: 83
End: 2018-12-13
Payer: MEDICARE

## 2018-12-13 DIAGNOSIS — L97.419 DIABETIC ULCER OF RIGHT HEEL ASSOCIATED WITH TYPE 2 DIABETES MELLITUS, UNSPECIFIED ULCER STAGE (HCC): Primary | ICD-10-CM

## 2018-12-13 DIAGNOSIS — E11.621 DIABETIC ULCER OF RIGHT HEEL ASSOCIATED WITH TYPE 2 DIABETES MELLITUS, UNSPECIFIED ULCER STAGE (HCC): Primary | ICD-10-CM

## 2018-12-13 PROCEDURE — 99308 SBSQ NF CARE LOW MDM 20: CPT | Performed by: NURSE PRACTITIONER

## 2018-12-20 ENCOUNTER — OUTSIDE SERVICES (OUTPATIENT)
Dept: WOUND CARE | Age: 83
End: 2018-12-20
Payer: MEDICARE

## 2018-12-20 DIAGNOSIS — L97.419 DIABETIC ULCER OF RIGHT HEEL ASSOCIATED WITH TYPE 2 DIABETES MELLITUS, UNSPECIFIED ULCER STAGE (HCC): Primary | ICD-10-CM

## 2018-12-20 DIAGNOSIS — E11.621 DIABETIC ULCER OF RIGHT HEEL ASSOCIATED WITH TYPE 2 DIABETES MELLITUS, UNSPECIFIED ULCER STAGE (HCC): Primary | ICD-10-CM

## 2018-12-20 DIAGNOSIS — I73.9 PAD (PERIPHERAL ARTERY DISEASE) (HCC): ICD-10-CM

## 2018-12-20 PROCEDURE — 99308 SBSQ NF CARE LOW MDM 20: CPT | Performed by: NURSE PRACTITIONER

## 2018-12-20 NOTE — PROGRESS NOTES
Wound Care Progress Note       Jaydon RAMOS Ridevan  AGE: 80 y.o. GENDER: female  : 1925  TODAY'S DATE:  2018        Subjective:     Chief Complaint   Patient presents with    Wound Check     right heel         HISTORY of PRESENT ILLNESS     Eugene Barrett is a 80 y.o. female who presents today for wound evaluation of Chronic arterial and diabetic wound(s) of right medial heel. The wound is of moderate severity. The underlying cause of the wound is diabetes and PAD. Wound bed covered by stable, black eschar. Wound Pain Timing/Severity: none  Quality of pain: N/A  Severity of pain:  0 / 10   Modifying Factors: edema, diabetes, obesity and arterial insufficiency  Associated Signs/Symptoms: none        PAST MEDICAL HISTORY        Diagnosis Date    [facility]fib [facility])     Carotid artery stenosis     [facility]     Chronic back pain     Compression fx, lumbar spine ([facility])     scheduled for vertebroplasty 2014\"not sure if lumbar or thoracic\"    H/O 24 hour EKG monitoring 00    Frequent PACs, non sustained SVT with a rate of 130 bpm    H/O cardiovascular stress test 9/15/09    Normal pattern of perfusion in all coronaries EF 68%    H/O Doppler ultrasound      Less than 50% stenosis of both internal carotid arteries. 3/11 Carotid-No significant stenosis    H/O echocardiogram        Mild elevated pulm. systolic pressure. Mild MT/TR and aortic valve regur.   Mild MR, AR, mild pulmonary hypertension EF 50-55%    H/O hypotension     Hyperlipidemia     Hypertension     Lumbar spinal stenosis     Osteoarthritis     bilateral shoulder pain    PVD (peripheral vascular disease) ([facility])     Type II or unspecified type diabetes mellitus without mention of complication, not stated as uncontrolled     UTI (lower urinary tract infection)        PAST SURGICAL HISTORY    Past Surgical History:   Procedure Laterality Date    CATARACT REMOVAL      CHOLECYSTECTOMY     

## 2018-12-27 ENCOUNTER — OUTSIDE SERVICES (OUTPATIENT)
Dept: WOUND CARE | Age: 83
End: 2018-12-27
Payer: MEDICARE

## 2018-12-27 DIAGNOSIS — I73.9 PAD (PERIPHERAL ARTERY DISEASE) (HCC): ICD-10-CM

## 2018-12-27 DIAGNOSIS — E11.621 DIABETIC ULCER OF RIGHT HEEL ASSOCIATED WITH TYPE 2 DIABETES MELLITUS, UNSPECIFIED ULCER STAGE (HCC): Primary | ICD-10-CM

## 2018-12-27 DIAGNOSIS — L97.419 DIABETIC ULCER OF RIGHT HEEL ASSOCIATED WITH TYPE 2 DIABETES MELLITUS, UNSPECIFIED ULCER STAGE (HCC): Primary | ICD-10-CM

## 2018-12-27 PROCEDURE — 99308 SBSQ NF CARE LOW MDM 20: CPT | Performed by: NURSE PRACTITIONER

## 2019-01-03 ENCOUNTER — OUTSIDE SERVICES (OUTPATIENT)
Dept: WOUND CARE | Age: 84
End: 2019-01-03
Payer: MEDICARE

## 2019-01-03 DIAGNOSIS — L97.419 DIABETIC ULCER OF RIGHT HEEL ASSOCIATED WITH TYPE 2 DIABETES MELLITUS, UNSPECIFIED ULCER STAGE (HCC): Primary | ICD-10-CM

## 2019-01-03 DIAGNOSIS — E11.621 DIABETIC ULCER OF RIGHT HEEL ASSOCIATED WITH TYPE 2 DIABETES MELLITUS, UNSPECIFIED ULCER STAGE (HCC): Primary | ICD-10-CM

## 2019-01-03 DIAGNOSIS — I73.9 PAD (PERIPHERAL ARTERY DISEASE) (HCC): ICD-10-CM

## 2019-01-03 PROCEDURE — 99308 SBSQ NF CARE LOW MDM 20: CPT | Performed by: NURSE PRACTITIONER

## 2019-01-10 ENCOUNTER — OUTSIDE SERVICES (OUTPATIENT)
Dept: WOUND CARE | Age: 84
End: 2019-01-10
Payer: MEDICARE

## 2019-01-10 DIAGNOSIS — L97.419 DIABETIC ULCER OF RIGHT HEEL ASSOCIATED WITH TYPE 2 DIABETES MELLITUS, UNSPECIFIED ULCER STAGE (HCC): Primary | ICD-10-CM

## 2019-01-10 DIAGNOSIS — I73.9 PAD (PERIPHERAL ARTERY DISEASE) (HCC): ICD-10-CM

## 2019-01-10 DIAGNOSIS — E11.621 DIABETIC ULCER OF RIGHT HEEL ASSOCIATED WITH TYPE 2 DIABETES MELLITUS, UNSPECIFIED ULCER STAGE (HCC): Primary | ICD-10-CM

## 2019-01-10 PROCEDURE — 99308 SBSQ NF CARE LOW MDM 20: CPT | Performed by: NURSE PRACTITIONER

## 2019-01-17 ENCOUNTER — OUTSIDE SERVICES (OUTPATIENT)
Dept: WOUND CARE | Age: 84
End: 2019-01-17
Payer: MEDICARE

## 2019-01-17 DIAGNOSIS — I73.9 PAD (PERIPHERAL ARTERY DISEASE) (HCC): ICD-10-CM

## 2019-01-17 DIAGNOSIS — E11.621 DIABETIC ULCER OF RIGHT HEEL ASSOCIATED WITH TYPE 2 DIABETES MELLITUS, UNSPECIFIED ULCER STAGE (HCC): Primary | ICD-10-CM

## 2019-01-17 DIAGNOSIS — L97.419 DIABETIC ULCER OF RIGHT HEEL ASSOCIATED WITH TYPE 2 DIABETES MELLITUS, UNSPECIFIED ULCER STAGE (HCC): Primary | ICD-10-CM

## 2019-01-17 PROCEDURE — 11042 DBRDMT SUBQ TIS 1ST 20SQCM/<: CPT | Performed by: NURSE PRACTITIONER

## 2019-01-24 ENCOUNTER — OUTSIDE SERVICES (OUTPATIENT)
Dept: WOUND CARE | Age: 84
End: 2019-01-24
Payer: MEDICARE

## 2019-01-24 DIAGNOSIS — I73.9 PAD (PERIPHERAL ARTERY DISEASE) (HCC): ICD-10-CM

## 2019-01-24 DIAGNOSIS — L97.419 DIABETIC ULCER OF RIGHT HEEL ASSOCIATED WITH TYPE 2 DIABETES MELLITUS, UNSPECIFIED ULCER STAGE (HCC): Primary | ICD-10-CM

## 2019-01-24 DIAGNOSIS — E11.621 DIABETIC ULCER OF RIGHT HEEL ASSOCIATED WITH TYPE 2 DIABETES MELLITUS, UNSPECIFIED ULCER STAGE (HCC): Primary | ICD-10-CM

## 2019-01-24 PROCEDURE — 99308 SBSQ NF CARE LOW MDM 20: CPT | Performed by: NURSE PRACTITIONER

## 2019-01-31 ENCOUNTER — OUTSIDE SERVICES (OUTPATIENT)
Dept: WOUND CARE | Age: 84
End: 2019-01-31
Payer: MEDICARE

## 2019-01-31 DIAGNOSIS — L97.419 DIABETIC ULCER OF RIGHT HEEL ASSOCIATED WITH TYPE 2 DIABETES MELLITUS, UNSPECIFIED ULCER STAGE (HCC): Primary | ICD-10-CM

## 2019-01-31 DIAGNOSIS — E11.621 DIABETIC ULCER OF RIGHT HEEL ASSOCIATED WITH TYPE 2 DIABETES MELLITUS, UNSPECIFIED ULCER STAGE (HCC): Primary | ICD-10-CM

## 2019-01-31 DIAGNOSIS — I73.9 PAD (PERIPHERAL ARTERY DISEASE) (HCC): ICD-10-CM

## 2019-01-31 PROCEDURE — 11042 DBRDMT SUBQ TIS 1ST 20SQCM/<: CPT | Performed by: NURSE PRACTITIONER

## 2019-02-07 ENCOUNTER — OUTSIDE SERVICES (OUTPATIENT)
Dept: WOUND CARE | Age: 84
End: 2019-02-07
Payer: MEDICARE

## 2019-02-07 DIAGNOSIS — I73.9 PAD (PERIPHERAL ARTERY DISEASE) (HCC): ICD-10-CM

## 2019-02-07 DIAGNOSIS — E11.621 DIABETIC ULCER OF RIGHT HEEL ASSOCIATED WITH TYPE 2 DIABETES MELLITUS, UNSPECIFIED ULCER STAGE (HCC): Primary | ICD-10-CM

## 2019-02-07 DIAGNOSIS — L97.419 DIABETIC ULCER OF RIGHT HEEL ASSOCIATED WITH TYPE 2 DIABETES MELLITUS, UNSPECIFIED ULCER STAGE (HCC): Primary | ICD-10-CM

## 2019-02-07 PROCEDURE — 11042 DBRDMT SUBQ TIS 1ST 20SQCM/<: CPT | Performed by: NURSE PRACTITIONER

## 2019-02-14 ENCOUNTER — OUTSIDE SERVICES (OUTPATIENT)
Dept: WOUND CARE | Age: 84
End: 2019-02-14
Payer: MEDICARE

## 2019-02-14 DIAGNOSIS — E11.621 DIABETIC ULCER OF RIGHT HEEL ASSOCIATED WITH TYPE 2 DIABETES MELLITUS, UNSPECIFIED ULCER STAGE (HCC): Primary | ICD-10-CM

## 2019-02-14 DIAGNOSIS — I73.9 PAD (PERIPHERAL ARTERY DISEASE) (HCC): ICD-10-CM

## 2019-02-14 DIAGNOSIS — L97.419 DIABETIC ULCER OF RIGHT HEEL ASSOCIATED WITH TYPE 2 DIABETES MELLITUS, UNSPECIFIED ULCER STAGE (HCC): Primary | ICD-10-CM

## 2019-02-14 PROCEDURE — 99308 SBSQ NF CARE LOW MDM 20: CPT | Performed by: NURSE PRACTITIONER

## 2019-02-21 ENCOUNTER — OUTSIDE SERVICES (OUTPATIENT)
Dept: WOUND CARE | Age: 84
End: 2019-02-21
Payer: MEDICARE

## 2019-02-21 DIAGNOSIS — L97.419 DIABETIC ULCER OF RIGHT HEEL ASSOCIATED WITH TYPE 2 DIABETES MELLITUS, UNSPECIFIED ULCER STAGE (HCC): Primary | ICD-10-CM

## 2019-02-21 DIAGNOSIS — E11.621 DIABETIC ULCER OF RIGHT HEEL ASSOCIATED WITH TYPE 2 DIABETES MELLITUS, UNSPECIFIED ULCER STAGE (HCC): Primary | ICD-10-CM

## 2019-02-21 DIAGNOSIS — I73.9 PAD (PERIPHERAL ARTERY DISEASE) (HCC): ICD-10-CM

## 2019-02-21 PROCEDURE — 99308 SBSQ NF CARE LOW MDM 20: CPT | Performed by: NURSE PRACTITIONER

## 2019-02-28 ENCOUNTER — OUTSIDE SERVICES (OUTPATIENT)
Dept: WOUND CARE | Age: 84
End: 2019-02-28
Payer: MEDICARE

## 2019-02-28 DIAGNOSIS — E11.621 DIABETIC ULCER OF RIGHT HEEL ASSOCIATED WITH TYPE 2 DIABETES MELLITUS, UNSPECIFIED ULCER STAGE (HCC): Primary | ICD-10-CM

## 2019-02-28 DIAGNOSIS — L97.419 DIABETIC ULCER OF RIGHT HEEL ASSOCIATED WITH TYPE 2 DIABETES MELLITUS, UNSPECIFIED ULCER STAGE (HCC): Primary | ICD-10-CM

## 2019-02-28 DIAGNOSIS — I73.9 PAD (PERIPHERAL ARTERY DISEASE) (HCC): ICD-10-CM

## 2019-02-28 PROCEDURE — 99308 SBSQ NF CARE LOW MDM 20: CPT | Performed by: NURSE PRACTITIONER

## 2019-03-07 ENCOUNTER — OUTSIDE SERVICES (OUTPATIENT)
Dept: WOUND CARE | Age: 84
End: 2019-03-07
Payer: MEDICARE

## 2019-03-07 DIAGNOSIS — E11.621 DIABETIC ULCER OF RIGHT HEEL ASSOCIATED WITH TYPE 2 DIABETES MELLITUS, UNSPECIFIED ULCER STAGE (HCC): Primary | ICD-10-CM

## 2019-03-07 DIAGNOSIS — L97.419 DIABETIC ULCER OF RIGHT HEEL ASSOCIATED WITH TYPE 2 DIABETES MELLITUS, UNSPECIFIED ULCER STAGE (HCC): Primary | ICD-10-CM

## 2019-03-07 DIAGNOSIS — I73.9 PAD (PERIPHERAL ARTERY DISEASE) (HCC): ICD-10-CM

## 2019-03-07 PROCEDURE — 99308 SBSQ NF CARE LOW MDM 20: CPT | Performed by: NURSE PRACTITIONER

## 2019-03-21 ENCOUNTER — OUTSIDE SERVICES (OUTPATIENT)
Dept: WOUND CARE | Age: 84
End: 2019-03-21
Payer: MEDICARE

## 2019-03-21 DIAGNOSIS — L97.412 DIABETIC ULCER OF RIGHT HEEL ASSOCIATED WITH TYPE 2 DIABETES MELLITUS, WITH FAT LAYER EXPOSED (HCC): Primary | ICD-10-CM

## 2019-03-21 DIAGNOSIS — I73.9 PAD (PERIPHERAL ARTERY DISEASE) (HCC): ICD-10-CM

## 2019-03-21 DIAGNOSIS — E11.621 DIABETIC ULCER OF RIGHT HEEL ASSOCIATED WITH TYPE 2 DIABETES MELLITUS, WITH FAT LAYER EXPOSED (HCC): Primary | ICD-10-CM

## 2019-03-21 PROCEDURE — 99308 SBSQ NF CARE LOW MDM 20: CPT | Performed by: NURSE PRACTITIONER

## 2019-03-28 ENCOUNTER — OUTSIDE SERVICES (OUTPATIENT)
Dept: WOUND CARE | Age: 84
End: 2019-03-28
Payer: MEDICARE

## 2019-03-28 DIAGNOSIS — E11.621 DIABETIC ULCER OF RIGHT HEEL ASSOCIATED WITH TYPE 2 DIABETES MELLITUS, WITH FAT LAYER EXPOSED (HCC): Primary | ICD-10-CM

## 2019-03-28 DIAGNOSIS — I73.9 PAD (PERIPHERAL ARTERY DISEASE) (HCC): ICD-10-CM

## 2019-03-28 DIAGNOSIS — L97.412 DIABETIC ULCER OF RIGHT HEEL ASSOCIATED WITH TYPE 2 DIABETES MELLITUS, WITH FAT LAYER EXPOSED (HCC): Primary | ICD-10-CM

## 2019-03-28 PROCEDURE — 99308 SBSQ NF CARE LOW MDM 20: CPT | Performed by: NURSE PRACTITIONER

## 2019-04-04 ENCOUNTER — OUTSIDE SERVICES (OUTPATIENT)
Dept: WOUND CARE | Age: 84
End: 2019-04-04
Payer: MEDICARE

## 2019-04-04 DIAGNOSIS — L97.412 DIABETIC ULCER OF RIGHT HEEL ASSOCIATED WITH TYPE 2 DIABETES MELLITUS, WITH FAT LAYER EXPOSED (HCC): Primary | ICD-10-CM

## 2019-04-04 DIAGNOSIS — E11.621 DIABETIC ULCER OF RIGHT HEEL ASSOCIATED WITH TYPE 2 DIABETES MELLITUS, WITH FAT LAYER EXPOSED (HCC): Primary | ICD-10-CM

## 2019-04-04 PROCEDURE — 99308 SBSQ NF CARE LOW MDM 20: CPT | Performed by: NURSE PRACTITIONER

## 2019-04-04 NOTE — PROGRESS NOTES
Wound Care  Progress Note       Jaydon RAMOS Ridevan  AGE: 80 y.o. GENDER: female  : 1925  TODAY'S DATE:  2019        Subjective:     Chief Complaint   Patient presents with    Wound Check     Right medial heel         HISTORY of PRESENT ILLNESS     Jose Luis Morse is a 80 y.o. female who presents today for wound evaluation of Chronic arterial and diabetic ulcer(s) of right medial heel. The ulcer is of moderate severity. The underlying cause of the wound is diabetic and PVD. Wound Pain Timing/Severity: intermittent  Quality of pain: tender  Severity of pain:  3 / 10   Modifying Factors: edema, diabetes, decreased mobility, obesity and arterial insufficiency  Associated Signs/Symptoms: none        PAST MEDICAL HISTORY        Diagnosis Date    [facility]fib [facility])     Carotid artery stenosis     [facility]     Chronic back pain     Compression fx, lumbar spine ([address]     scheduled for vertebroplasty 2014\"not sure if lumbar or thoracic\"    H/O 24 hour EKG monitoring 00    Frequent PACs, non sustained SVT with a rate of 130 bpm    H/O cardiovascular stress test 9/15/09    Normal pattern of perfusion in all coronaries EF 68%    H/O Doppler ultrasound      Less than 50% stenosis of both internal carotid arteries. 3/11 Carotid-No significant stenosis    H/O echocardiogram        Mild elevated pulm. systolic pressure. Mild MT/TR and aortic valve regur.   Mild MR, AR, mild pulmonary hypertension EF 50-55%    H/O hypotension     Hyperlipidemia     Hypertension     Lumbar spinal stenosis     Osteoarthritis     bilateral shoulder pain    [facility]per[facility]     Type II or unspecified type diabetes mellitus without mention of complication, not stated as uncontrolled     UTI (lower urinary tract infection)        PAST SURGICAL HISTORY    Past Surgical History:   Procedure Laterality Date    CATARACT REMOVAL      CHOLECYSTECTOMY      CYSTOCELE REPAIR      GALLBLADDER SURGERY      HYSTERECTOMY      JOINT REPLACEMENT      RECTOCELE REPAIR      GARY AND BSO      TOTAL HIP ARTHROPLASTY      bilateral       FAMILY HISTORY    Family History   Problem Relation Age of Onset    Diabetes Mother     Stroke Mother     Heart Disease Sister     Diabetes Sister     Heart Disease Brother     Diabetes Brother     Heart Disease Sister     Diabetes Sister     Heart Disease Brother     Diabetes Brother        SOCIAL HISTORY    Social History     Tobacco Use    Smoking status: Never Smoker    Smokeless tobacco: Never Used    Tobacco comment: caregiver unsure of pts social of surgical hx with phone assess 2/25/2014   Substance Use Topics    Alcohol use: No    Drug use: No       ALLERGIES    No Known Allergies    MEDICATIONS    Current Outpatient Medications on File Prior to Visit   Medication Sig Dispense Refill    simvastatin (ZOCOR) 20 MG tablet Take 20 mg by mouth nightly.  HYDROcodone-acetaminophen (NORCO) 5-325 MG per tablet Take 1 tablet by mouth every 6 hours as needed for Pain.  traMADol (ULTRAM) 50 MG tablet Take 50 mg by mouth every 8 hours as needed for Pain.  aspirin 81 MG EC tablet Take 1 tablet by mouth daily. 30 tablet 1    insulin glargine (LANTUS) 100 UNIT/ML injection Inject 12 Units into the skin nightly. 1 Pen 3    docusate sodium (COLACE) 100 MG capsule Take 1 capsule by mouth 2 times daily as needed for Constipation. 30 capsule 0    furosemide (LASIX) 40 MG tablet Take 0.5 tablets by mouth as needed (LL swelling). 30 tablet 1    acetaminophen 650 MG TABS Take 650 mg by mouth every 4 hours as needed for Pain. 30 tablet 1    carvedilol (COREG) 6.25 MG tablet Take 6.25 mg by mouth 2 times daily (with meals).  citalopram (CELEXA) 10 MG tablet Take 10 mg by mouth daily.  timolol (BETIMOL) 0.25 % ophthalmic solution Place 1-2 drops into both eyes 2 times daily.       atorvastatin (LIPITOR) 20 MG tablet Take 20 mg by mouth daily. No current facility-administered medications on file prior to visit. REVIEW OF SYSTEMS    Pertinent items are noted in HPI. Constitutional: Negative for systemic symptoms including fever, chills and malaise. Objective: There were no vitals taken for this visit. PHYSICAL EXAM    General: The patient is in no acute distress. Mental status:  Patient is appropriate, is  oriented to place and plan of care. Dermatologic exam: Visual inspection of the periwound reveals the skin to be edematous. Wound exam:  see wound description below All active wounds listed below with today's date are evaluated    Right medial heel: Length: 2 cm, width  1.8 cm, depth 0.6 cm, 50% pink, 50% yellow       Assessment:       Problem List Items Addressed This Visit     Diabetic ulcer of right heel associated with type 2 diabetes mellitus ([facility] [address] - Primary          Status of wound progress and description from last visit: Slightly larger. Patient having tingling sensation on top of foot. Patient is on hospice with  comfort care. Plan:         Do not scrub or use excessive force. Wash hands with soap and water before and after dressing changes. Prior to dressing application, cleanse wound with normal saline, wound cleanser, or mild soap and water. To right medial heel, apply nickel-thick layer of Santyl to wound bed, then place fibracol to wound bed, cover with saline moist gauze, then abd pad, wrap with conform. Change twice daily and as needed. Avoid pressure to the heel area, including shoes that have backs. Float heels off of mattress and chairs at all times. Encourage patient to elevate legs throughout the day.  Patient may wear tubi  stockings - place on in the morning and take off at night.      Electronically signed by RENALDO Mcguire CNP on 4/4/2019 at 2:18 PM

## 2019-04-11 ENCOUNTER — OUTSIDE SERVICES (OUTPATIENT)
Dept: WOUND CARE | Age: 84
End: 2019-04-11
Payer: MEDICARE

## 2019-04-11 DIAGNOSIS — L97.412 DIABETIC ULCER OF RIGHT HEEL ASSOCIATED WITH TYPE 2 DIABETES MELLITUS, WITH FAT LAYER EXPOSED (HCC): Primary | ICD-10-CM

## 2019-04-11 DIAGNOSIS — I73.9 PAD (PERIPHERAL ARTERY DISEASE) (HCC): ICD-10-CM

## 2019-04-11 DIAGNOSIS — E11.621 DIABETIC ULCER OF RIGHT HEEL ASSOCIATED WITH TYPE 2 DIABETES MELLITUS, WITH FAT LAYER EXPOSED (HCC): Primary | ICD-10-CM

## 2019-04-11 PROCEDURE — 99308 SBSQ NF CARE LOW MDM 20: CPT | Performed by: NURSE PRACTITIONER

## 2019-04-11 NOTE — PROGRESS NOTES
Wound Care Progress Note       Jaydon RAMOS Ridevan  AGE: 80 y.o. GENDER: female  : 1925  TODAY'S DATE:  2019        Subjective:     Chief Complaint   Patient presents with    Wound Check     Right heel         HISTORY of PRESENT ILLNESS     Tre Jacob is a 80 y.o. female who presents today for wound evaluation of Chronic arterial and diabetic wound(s) of right medial heel. The wound is of marked severity. The underlying cause of the wound is diabetes and PVD. Patient and daughter refuse arterial interventions. Patient is followed by hospice service. Wound Pain Timing/Severity: intermittent  Quality of pain: tender  Severity of pain:  2 / 10   Modifying Factors: edema, diabetes, decreased mobility, obesity, arterial insufficiency and decreased tissue oxygenation  Associated Signs/Symptoms: none        PAST MEDICAL HISTORY        Diagnosis Date    [facility])     Carotid artery stenosis     [facility]     Chronic back pain     Compression fx, lumbar spine ([address]     scheduled for vertebroplasty 2014\"not sure if lumbar or thoracic\"    H/O 24 hour EKG monitoring 00    Frequent PACs, non sustained SVT with a rate of 130 bpm    H/O cardiovascular stress test 9/15/09    Normal pattern of perfusion in all coronaries EF 68%    H/O Doppler ultrasound      Less than 50% stenosis of both internal carotid arteries. 3/11 Carotid-No significant stenosis    H/O echocardiogram        Mild elevated pulm. systolic pressure. Mild MT/TR and aortic valve regur.   Mild MR, AR, mild pulmonary hypertension EF 50-55%    H/O hypotension     Hyperlipidemia     Hypertension     Lumbar spinal stenosis     Osteoarthritis     bilateral shoulder pain    PVD [facility]peripheral vascular disease) [facility]     Type II or unspecified type diabetes mellitus without mention of complication, not stated as uncontrolled     UTI (lower urinary tract infection)        PAST SURGICAL HISTORY    Past Surgical History:   Procedure Laterality Date    CATARACT REMOVAL      CHOLECYSTECTOMY      CYSTOCELE REPAIR      GALLBLADDER SURGERY      HYSTERECTOMY      JOINT REPLACEMENT      RECTOCELE REPAIR      GARY AND BSO      TOTAL HIP ARTHROPLASTY      bilateral       FAMILY HISTORY    Family History   Problem Relation Age of Onset    Diabetes Mother     Stroke Mother     Heart Disease Sister     Diabetes Sister     Heart Disease Brother     Diabetes Brother     Heart Disease Sister     Diabetes Sister     Heart Disease Brother     Diabetes Brother        SOCIAL HISTORY    Social History     Tobacco Use    Smoking status: Never Smoker    Smokeless tobacco: Never Used    Tobacco comment: caregiver unsure of pts social of surgical hx with phone assess 2/25/2014   Substance Use Topics    Alcohol use: No    Drug use: No       ALLERGIES    No Known Allergies    MEDICATIONS    Current Outpatient Medications on File Prior to Visit   Medication Sig Dispense Refill    simvastatin (ZOCOR) 20 MG tablet Take 20 mg by mouth nightly.  HYDROcodone-acetaminophen (NORCO) 5-325 MG per tablet Take 1 tablet by mouth every 6 hours as needed for Pain.  traMADol (ULTRAM) 50 MG tablet Take 50 mg by mouth every 8 hours as needed for Pain.  aspirin 81 MG EC tablet Take 1 tablet by mouth daily. 30 tablet 1    insulin glargine (LANTUS) 100 UNIT/ML injection Inject 12 Units into the skin nightly. 1 Pen 3    docusate sodium (COLACE) 100 MG capsule Take 1 capsule by mouth 2 times daily as needed for Constipation. 30 capsule 0    furosemide (LASIX) 40 MG tablet Take 0.5 tablets by mouth as needed (LL swelling). 30 tablet 1    acetaminophen 650 MG TABS Take 650 mg by mouth every 4 hours as needed for Pain. 30 tablet 1    carvedilol (COREG) 6.25 MG tablet Take 6.25 mg by mouth 2 times daily (with meals).  citalopram (CELEXA) 10 MG tablet Take 10 mg by mouth daily.         timolol (BETIMOL) 0.25 % ophthalmic solution Place 1-2 drops into both eyes 2 times daily.  atorvastatin (LIPITOR) 20 MG tablet Take 20 mg by mouth daily. No current facility-administered medications on file prior to visit. REVIEW OF SYSTEMS    Pertinent items are noted in HPI. Constitutional: Negative for systemic symptoms including fever, chills and malaise. Objective: There were no vitals taken for this visit. PHYSICAL EXAM    General: The patient is in no acute distress. Mental status:  Patient is appropriate, is  oriented to place and plan of care. Dermatologic exam: Visual inspection of the periwound reveals the skin to be edematous. Wound exam:  see wound description below     All active wounds listed below with today's date are evaluated    Right medial heel: Length: 2 cm, width  2 cm, depth 0.8 cm, 75% pink, 25% yellow    Assessment:     Problem List Items Addressed This Visit     Diabetic ulcer of right heel associated with type 2 diabetes mellitus ([facility] [address] - Primary    PAD (peripheral artery disease) ([facility] [address]          Status of wound progress and description from last visit:   unchanged. Plan:      Do not scrub or use excessive force. Wash hands with soap and water before and after dressing changes. Prior to dressing application, cleanse wound with normal saline, wound cleanser, or mild soap and water. To right medial heel, apply nickel-thick layer of Santyl to wound bed, then place fibracol to wound bed, cover with saline moist gauze, then abd pad, wrap with conform. Change twice daily and as needed. Avoid pressure to the heel area, including shoes that have backs. Float heels off of mattress and chairs at all times. Encourage patient to elevate legs throughout the day. Patient may wear tubi  stockings - place on in the morning and take off at night.              Electronically signed by RENALDO Pérez CNP on 4/11/2019 at 3:22 PM

## 2019-04-18 ENCOUNTER — OUTSIDE SERVICES (OUTPATIENT)
Dept: WOUND CARE | Age: 84
End: 2019-04-18
Payer: MEDICARE

## 2019-04-18 DIAGNOSIS — I73.9 PAD (PERIPHERAL ARTERY DISEASE) (HCC): ICD-10-CM

## 2019-04-18 DIAGNOSIS — E11.621 DIABETIC ULCER OF RIGHT HEEL ASSOCIATED WITH TYPE 2 DIABETES MELLITUS, WITH FAT LAYER EXPOSED (HCC): Primary | ICD-10-CM

## 2019-04-18 DIAGNOSIS — L97.412 DIABETIC ULCER OF RIGHT HEEL ASSOCIATED WITH TYPE 2 DIABETES MELLITUS, WITH FAT LAYER EXPOSED (HCC): Primary | ICD-10-CM

## 2019-04-18 PROCEDURE — 99308 SBSQ NF CARE LOW MDM 20: CPT | Performed by: NURSE PRACTITIONER

## 2019-04-18 NOTE — PROGRESS NOTES
Wound Care Progress Note       Jaydon RAMOS Ridevan  AGE: 80 y.o. GENDER: female  : 1925  TODAY'S DATE:  2019        Subjective:     Chief Complaint   Patient presents with    Wound Check     R heel         HISTORY of PRESENT ILLNESS     Dick Velásquez is a 80 y.o. female who presents today for wound evaluation of Chronic arterial and diabetic wound(s) of right medial heel. The wound is of moderate severity. The underlying cause of the wound is diabetes and PVD. Wound Pain Timing/Severity: intermittent  Quality of pain: tender  Severity of pain:  0 / 10   Modifying Factors: edema, diabetes and arterial insufficiency  Associated Signs/Symptoms: none        PAST MEDICAL HISTORY        Diagnosis Date    [facility]fib [facility])     Carotid artery stenosis     [facility]est[facility]     Chronic back pain     Compression fx, lumbar spine ([address]     scheduled for vertebroplasty 2014\"not sure if lumbar or thoracic\"    H/O 24 hour EKG monitoring 00    Frequent PACs, non sustained SVT with a rate of 130 bpm    H/O cardiovascular stress test 9/15/09    Normal pattern of perfusion in all coronaries EF 68%    H/O Doppler ultrasound      Less than 50% stenosis of both internal carotid arteries. 3/11 Carotid-No significant stenosis    H/O echocardiogram        Mild elevated pulm. systolic pressure. Mild MT/TR and aortic valve regur.   Mild MR, AR, mild pulmonary hypertension EF 50-55%    H/O hypotension     Hyperlipidemia     Hypertension     Lumbar spinal stenosis     Osteoarthritis     bilateral shoulder pain    PVD [facility]peripheral vascular disease) ([facility]     Type II or unspecified type diabetes mellitus without mention of complication, not stated as uncontrolled     UTI (lower urinary tract infection)        PAST SURGICAL HISTORY    Past Surgical History:   Procedure Laterality Date    CATARACT REMOVAL      CHOLECYSTECTOMY      CYSTOCELE REPAIR      GALLBLADDER SURGERY